# Patient Record
(demographics unavailable — no encounter records)

---

## 2025-03-31 NOTE — HISTORY OF PRESENT ILLNESS
[FreeTextEntry1] : 68 y/o F w Hx of hypothyroidism, remote lymphoma on remission, HLD, HTN here for initial evaluation and management of thyroid issues generally feels well and endorses no acute complaints. reports URI in 1/2025, followed by severe ant neck pain, notes palpitations and unintentional weight loss at the time. 12/2024 TFTs were wnl on long term LT4 replacement of 50 mcg. TFTs on 2/2025 showed TSH of 0.09 and fT4 of 1.5, started taking selenium 200 mcg w/ some symptomatic improvement. denies taking any other meds or supplements. She otherwise denies any f/c, CP, SOB, palpitations, tremors, depressed mood, anxiety, palpitations, n/v, stool/urinary abn, skin/weight changes, heat/cold intolerance, HAs, breast/nipple changes, polyuria/polydipsia/nocturia or other complaints. she again denies any dysphagia, hoarseness, neck tenderness or new palpable masses. she again denies any family history of thyroid disorders or personal exposure to ionizing radiation. US 2/2025 w/ atrophic heterogenous parenchyma and increased vascularity, no reported discrete nodules. Yes

## 2025-03-31 NOTE — HISTORY OF PRESENT ILLNESS
[FreeTextEntry1] : 68 y/o F w Hx of hypothyroidism, remote lymphoma on remission, HLD, HTN here for initial evaluation and management of thyroid issues generally feels well and endorses no acute complaints. reports URI in 1/2025, followed by severe ant neck pain, notes palpitations and unintentional weight loss at the time. 12/2024 TFTs were wnl on long term LT4 replacement of 50 mcg. TFTs on 2/2025 showed TSH of 0.09 and fT4 of 1.5, started taking selenium 200 mcg w/ some symptomatic improvement. denies taking any other meds or supplements. She otherwise denies any f/c, CP, SOB, palpitations, tremors, depressed mood, anxiety, palpitations, n/v, stool/urinary abn, skin/weight changes, heat/cold intolerance, HAs, breast/nipple changes, polyuria/polydipsia/nocturia or other complaints. she again denies any dysphagia, hoarseness, neck tenderness or new palpable masses. she again denies any family history of thyroid disorders or personal exposure to ionizing radiation. US 2/2025 w/ atrophic heterogenous parenchyma and increased vascularity, no reported discrete nodules.

## 2025-03-31 NOTE — ASSESSMENT
[FreeTextEntry1] : 1) Hypothyroidism: Appears clinically euthyroid at this time on 75 mcg of LT4 (taking med appropriately). Reassess TFTs and need for medication titration at this time. Reviewed importance of compliance and proper intake. Transient ep of and neck pain and biochemical hyperthyroid labs likely consistent w/ subacute thyroiditis or burnout phase of hashimoto's. check repeat TFTs in 3 months , check antibodies and ESR. repeat US in 6 months Verbalized understanding and agrees with treatment plan, will contact MD and seek emergency medical care if condition changes.